# Patient Record
Sex: FEMALE | Race: WHITE | NOT HISPANIC OR LATINO | Employment: FULL TIME | URBAN - METROPOLITAN AREA
[De-identification: names, ages, dates, MRNs, and addresses within clinical notes are randomized per-mention and may not be internally consistent; named-entity substitution may affect disease eponyms.]

---

## 2017-01-06 ENCOUNTER — OFFICE VISIT (OUTPATIENT)
Dept: PSYCHIATRY | Facility: CLINIC | Age: 32
End: 2017-01-06

## 2017-01-06 VITALS
DIASTOLIC BLOOD PRESSURE: 58 MMHG | SYSTOLIC BLOOD PRESSURE: 111 MMHG | BODY MASS INDEX: 27.97 KG/M2 | WEIGHT: 174 LBS | HEIGHT: 66 IN | HEART RATE: 73 BPM

## 2017-01-06 DIAGNOSIS — F33.41 MDD (MAJOR DEPRESSIVE DISORDER), RECURRENT, IN PARTIAL REMISSION: ICD-10-CM

## 2017-01-06 DIAGNOSIS — F41.1 GAD (GENERALIZED ANXIETY DISORDER): Primary | ICD-10-CM

## 2017-01-06 PROCEDURE — 99212 OFFICE O/P EST SF 10 MIN: CPT | Mod: PBBFAC | Performed by: PSYCHIATRY & NEUROLOGY

## 2017-01-06 PROCEDURE — 99213 OFFICE O/P EST LOW 20 MIN: CPT | Mod: S$PBB,,, | Performed by: PSYCHIATRY & NEUROLOGY

## 2017-01-06 PROCEDURE — 99999 PR PBB SHADOW E&M-EST. PATIENT-LVL II: CPT | Mod: PBBFAC,,, | Performed by: PSYCHIATRY & NEUROLOGY

## 2017-01-06 RX ORDER — CITALOPRAM 40 MG/1
40 TABLET, FILM COATED ORAL DAILY
Qty: 30 TABLET | Refills: 2 | Status: SHIPPED | OUTPATIENT
Start: 2017-01-06 | End: 2018-01-06

## 2017-01-06 RX ORDER — HYDROXYZINE PAMOATE 50 MG/1
50 CAPSULE ORAL EVERY 8 HOURS PRN
Qty: 90 CAPSULE | Refills: 2 | Status: SHIPPED | OUTPATIENT
Start: 2017-01-06 | End: 2017-02-05

## 2017-01-06 NOTE — PROGRESS NOTES
"1/6/2017 8:05 AM   Lisandra Costello   1985   [id number]       OUTPATIENT PSYCHIATRY FOLLOW UP NOTE      Clinical Status of Patient:  Outpatient (Ambulatory)    Chief Complaint:  Lisandra Costello is a 31 y.o. female who presents today for follow-up of depression and anxiety.  Met with patient and I have reviewed the patient's medical history in detail and updated the computerized patient record.    Interval History and Content of Current Session:  Interim Events/Subjective Report/Content of Current Session:   30 y/o female w/PMH PAU presents for follow up, medication management. States mood today as "great". Denies any depression or anxiety - despite numerous stressors including mechanical problems with car and adjudication of prior DUI charge. 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 Continues to smoke marijuana "a few times a weeK" - though believes this is helpful for stress and anxiety. No alcohol use in approximately six months. Denies difficulty with sleep, appetite, and denies medication side effects. Did not endorse SI/HI/AVH.    Current psychiatric medications:  Celexa 40mg PO daily  Vistaril 50mg PO BID PRN ("I've been trying to just take it twice a day now")    SUBJECTIVE     Psychiatric Review Of Systems - Is patient experiencing or having changes in:  sleep: no  appetite: no  weight: no  energy/anergy: no  interest/pleasure/anhedonia: no  somatic symptoms: no  libido: no  guilty/hopelessness: no  concentration: no  S.I.B.s/risky behavior: no  SI/SA:  no    anxiety/panic: no  Agoraphobia:  no  Social phobia:  no  Recurrent nightmares:  no  hyper startle response:  no  Avoidance: no  Recurrent thoughts:  no  Recurrent behaviors:  no    Irritability: no  Racing thoughts: no  Impulsive behaviors: no  Pressured speech:  no    Paranoia:no  Delusions: no  AVH:no    Screens and Inventories:  none    Substance abuse:  ETOH- none in six months - prior DUI charge  Drugs- " marijuana a few times per week    Past Medical, Family and Social History: The patient's past medical, family and social history have been reviewed and updated as appropriate within the electronic medical record - see encounter notes.    Current Psychotropic medications:  See HPI    Compliance: yes    Side effects: None    Risk Parameters:  Patient reports no suicidal ideation  Patient reports no homicidal ideation  Patient reports no self-injurious behavior  Patient reports no violent behavior    Psychotherapy:  · none    Psychosocial Stressors: legal - pending community service for DUI charge - not a significant stressor to patient  Functioning Relationships: gets along well with co-workers  Strengths AND Liabilities  Strength: Patient accepts guidance/feedback, Strength: Patient is expressive/articulate., Strength: Patient is motivated for change., Strength: Patient is physically healthy., Strength: Patient has positive support network., Strength: Patient has reasonable judgment., Strength: Patient is stable.    OBJECTIVE     Medical ROS  General ROS: negative for - chills, fatigue or fever  Respiratory ROS: no cough, shortness of breath, or wheezing  Cardiovascular ROS: no chest pain or dyspnea on exertion  Gastrointestinal ROS: no abdominal pain, change in bowel habits, or black or bloody stools  Musculoskeletal ROS: negative for - gait disturbance, joint stiffness, muscle pain or muscular weakness  Neurological ROS: negative for - confusion, dizziness, gait disturbance, headaches, impaired coordination/balance, seizures or visual changes    Nutritional Screening: Considering the patient's height and weight, medications, medical history and preferences, should a referral be made to the dietitian? no    Musculoskeletal  Muscle Strength/Tone:  not examined   Gait & Station:  non-ataxic     Relevant Elements of Neurological Exam: normal gait  AIMS:  n/a    Constitutional  Vitals:  Most recent vital signs, dated  "greater than 90 days prior to this appointment, were reviewed.    Vitals:    01/06/17 0759   BP: (!) 111/58   Pulse: 73   Weight: 78.9 kg (174 lb)   Height: 5' 6" (1.676 m)          Allergies  Review of patient's allergies indicates:  No Known Allergies    Laboratory Data  No visits with results within 1 Month(s) from this visit.  Latest known visit with results is:    Lab Visit on 08/03/2016   Component Date Value Ref Range Status    Sodium 08/03/2016 140  136 - 145 mmol/L Final    Potassium 08/03/2016 4.0  3.5 - 5.1 mmol/L Final    Chloride 08/03/2016 102  95 - 110 mmol/L Final    CO2 08/03/2016 29  23 - 29 mmol/L Final    Glucose 08/03/2016 117* 70 - 110 mg/dL Final    BUN, Bld 08/03/2016 10  6 - 20 mg/dL Final    Creatinine 08/03/2016 0.8  0.5 - 1.4 mg/dL Final    Calcium 08/03/2016 9.9  8.7 - 10.5 mg/dL Final    Total Protein 08/03/2016 7.5  6.0 - 8.4 g/dL Final    Albumin 08/03/2016 4.2  3.5 - 5.2 g/dL Final    Total Bilirubin 08/03/2016 0.9  0.1 - 1.0 mg/dL Final    Alkaline Phosphatase 08/03/2016 77  55 - 135 U/L Final    AST 08/03/2016 26  10 - 40 U/L Final    ALT 08/03/2016 31  10 - 44 U/L Final    Anion Gap 08/03/2016 9  8 - 16 mmol/L Final    eGFR if African American 08/03/2016 >60.0  >60 mL/min/1.73 m^2 Final    eGFR if non African American 08/03/2016 >60.0  >60 mL/min/1.73 m^2 Final    TSH 08/03/2016 1.025  0.400 - 4.000 uIU/mL Final       All Medications  Outpatient Encounter Prescriptions as of 1/6/2017   Medication Sig Dispense Refill    citalopram (CELEXA) 40 MG tablet Take 1 tablet (40 mg total) by mouth once daily. 30 tablet 1    hydrOXYzine pamoate (VISTARIL) 50 MG Cap Take 1 capsule (50 mg total) by mouth every 8 (eight) hours as needed (Take 3 times a day as need for Anxiety). 90 capsule 1     No facility-administered encounter medications on file as of 1/6/2017.        Psychiatric Mental Status Exam  Appearance: unremarkable, age appropriate  Behavior/Cooperation: limited/ " "appopriate friendly and cooperative  Speech: appropriate rate, volume and tone  Mood: steady, happy  Affect:  congruent with mood and appropriate to situation/content    Thought Process: normal and logical  Thought Content: normal, no suicidality, no homicidality, delusions, or paranoia  Sensorium:    grossly intact  Alert and Oriented: x3  Memory: grossly intact    Attention/concentration: appropriate for age/education.  Similarities:  grossly intact  Abstract reasoning: grossly intact  Insight: Intact  Judgment: Intact    ASSESSMENT      Impression:     ICD-10-CM ICD-9-CM   1. PAU (generalized anxiety disorder) F41.1 300.02   2. MDD (major depressive disorder), recurrent, in partial remission F33.41 296.35       Treatment Goals:  Specify outcomes written in observable, behavioral terms:   Anxiety: reducing negative automatic thoughts, reducing physical symptoms of anxiety and via medication management  Depression: increasing energy, increasing interest in usual activities, increasing motivation, reducing fatigue and via medication management    Status/Progress: Based on the examination today, the patient's problem(s) is/are well controlled.  New problems have not been presented today.   Co-morbidities are not complicating management of the primary condition.  There are no active rule-out diagnoses for this patient at this time.     TREATMENT PLAN     Medication Management: Continue current medications. Celexa 40mg PO daily and Vistaril 50mg PO TID PRN ("I've been trying to just take it twice a day now"). Patient reporting remission of symptoms at this appointment. Will consider taper of celexa at future appointment. No side effects endorsed. Will consider EKG in the future to monitor for potential QTC prolongation.  · Labs: prior labs reviewed  · The treatment plan and follow up plan were reviewed with the patient.  · Discussed with patient informed consent, risks vs. benefits, alternative treatments, side effect " profile and the inherent unpredictability of individual responses to these treatments. The patient expresses understanding of the above and displays the capacity to agree with this current plan.  · Encouraged Patient to keep future appointments.   · Take medications as prescribed and abstain from substance abuse.   · In the event of an emergency patient was advised to go to the emergency room.    Return to Clinic: 3 months    Counseling/ psychotherapy time: 0  Total time: 16 minutes  Consulting clinician was informed of the encounter and consult note    DO IRVING Carter, [facility]   [phone]  1/6/2017 8:05 AM

## 2017-01-06 NOTE — PATIENT INSTRUCTIONS
[facility]  Mental Health Medications: Antidepressants    What are the side effects?    Antidepressants may cause mild side effects that usually do not last long. Any unusual reactions or side effects should be reported to a doctor immediately.  The most common side effects associated with SSRIs and SNRIs include:   Headache, which usually goes away within a few days.   Nausea (feeling sick to your stomach), which usually goes away within a few days.   Sleeplessness or drowsiness, which may happen during the first few weeks but then goes away. Sometimes the medication dose needs to be reduced or the time of day it is taken needs to be adjusted to help lessen these side effects.   Agitation (feeling jittery).   Sexual problems, which can affect both men and women and may include reduced sex drive, and problems having and enjoying sex.    Tricyclic antidepressants can cause side effects, including:   Dry mouth    Constipation    Bladder problems. It may be hard to empty the bladder, or the urine stream may not be as strong as usual. Older men with enlarged prostate conditions may be more affected.  Sexual problems, which can affect both men and women and may include reduced sex drive, and problems having and enjoying sex.   Blurred vision, which usually goes away quickly.   Drowsiness. Usually, antidepressants that make you drowsy are taken at bedtime.    People taking MAOIs need to be careful about the foods they eat and the medicines they take. Foods and medicines that contain high levels of a chemical called tyramine are dangerous for people taking MAOIs. Tyramine is found in some cheeses, xiang, and pickles. The chemical is also in some medications, including decongestants and over-the-counter cold medicine.    Mixing MAOIs and tyramine can cause a sharp increase in blood pressure, which can lead to stroke. People taking MAOIs should ask their doctors for a complete list of  foods, medicines, and other substances to avoid. An MAOI skin patch has recently been developed and may help reduce some of these risks. A doctor can help a person figure out if a patch or a pill will work for him or her.    How should antidepressants be taken?    People taking antidepressants need to follow their doctors directions. The medication should be taken in the right dose for the right amount of time. It can take three or four weeks until the medicine takes effect. Some people take the medications for a short time, and some people take them for much longer periods. People with long-term or severe depression may need to take medication for a long time.    Once a person is taking antidepressants, it is important not to stop taking them without the help of a doctor. Sometimes people taking antidepressants feel better and stop taking the medication too soon, and the depression may return. When it is time to stop the medication, the doctor will help the person slowly and safely decrease the dose. Its important to give the body time to adjust to the change. People dont get addicted, or hooked, on the medications, but stopping them abruptly can cause withdrawal symptoms.    FDA warning on antidepressants    Antidepressants are safe and popular, but some studies have suggested that they may have unintentional effects, especially in young people. In 2004, the FDA looked at published and unpublished data on trials of antidepressants that involved nearly 4,400 children and adolescents. They found that 4 percent of those taking antidepressants thought about or tried suicide (although no suicides occurred), compared to  2 percent of those receiving placebos (sugar pill).    In 2005, the FDA decided to adopt a black box warning label--the most serious type of warning-- on all antidepressant medications. The warning says there is an increased risk of suicidal thinking or attempts in children and adolescents taking  antidepressants. In 2007, the FDA proposed that makers of all antidepressant medications extend the warning to include young adults up through age 24.    The warning also says that patients of all ages taking antidepressants should be watched closely, especially during the first few weeks of treatment. Possible side effects to look for are depression that gets worse, suicidal thinking or behavior,  or any unusual changes in behavior such as trouble sleeping, agitation, or withdrawal from normal social situations. Families and caregivers should report any changes to the doctor. The latest information from the FDA can be found at http://www.fda.gov.    Results of a comprehensive review of pediatric trials conducted between 1988 and 2006 suggested that the benefits of antidepressant medications likely outweigh their risks to children and adolescents with major depression and anxiety disorders. The study was funded in part by [facility].    Finally, the FDA has warned that combining the newer SSRI or SNRI antidepressants with one or more serotonin based medication - such as the commonly-used triptan medications used to treat migraine headaches - could cause a life- threatening illness called serotonin syndrome.    A person with serotonin syndrome may be agitated, have hallucinations (see or hear things that are not real), have a high temperature, or have unusual blood pressure changes. Serotonin syndrome is usually associated with the older antidepressants called MAOIs, but it can happen with the newer antidepressants as well, if they are mixed with the wrong medications.

## 2017-01-06 NOTE — PROGRESS NOTES
The chart was reviewed, and the case was discussed with the resident.  I agree with the above assessment and plan.